# Patient Record
Sex: MALE | Race: BLACK OR AFRICAN AMERICAN | URBAN - METROPOLITAN AREA
[De-identification: names, ages, dates, MRNs, and addresses within clinical notes are randomized per-mention and may not be internally consistent; named-entity substitution may affect disease eponyms.]

---

## 2020-04-29 ENCOUNTER — EMERGENCY (EMERGENCY)
Facility: HOSPITAL | Age: 45
LOS: 1 days | Discharge: HOME | End: 2020-04-29
Admitting: EMERGENCY MEDICINE
Payer: COMMERCIAL

## 2020-04-29 VITALS
TEMPERATURE: 98 F | HEART RATE: 113 BPM | OXYGEN SATURATION: 97 % | DIASTOLIC BLOOD PRESSURE: 88 MMHG | WEIGHT: 179.9 LBS | HEIGHT: 72 IN | RESPIRATION RATE: 18 BRPM | SYSTOLIC BLOOD PRESSURE: 159 MMHG

## 2020-04-29 DIAGNOSIS — N99.528 OTHER COMPLICATION OF INCONTINENT EXTERNAL STOMA OF URINARY TRACT: Chronic | ICD-10-CM

## 2020-04-29 LAB
ALBUMIN SERPL ELPH-MCNC: 4.3 G/DL — SIGNIFICANT CHANGE UP (ref 3.4–5)
ALP SERPL-CCNC: 70 U/L — SIGNIFICANT CHANGE UP (ref 40–120)
ALT FLD-CCNC: 57 U/L — HIGH (ref 12–42)
ANION GAP SERPL CALC-SCNC: 9 MMOL/L — SIGNIFICANT CHANGE UP (ref 9–16)
APTT BLD: 30 SEC — SIGNIFICANT CHANGE UP (ref 27.5–36.3)
AST SERPL-CCNC: 22 U/L — SIGNIFICANT CHANGE UP (ref 15–37)
BASOPHILS # BLD AUTO: 0.02 K/UL — SIGNIFICANT CHANGE UP (ref 0–0.2)
BASOPHILS NFR BLD AUTO: 0.3 % — SIGNIFICANT CHANGE UP (ref 0–2)
BILIRUB SERPL-MCNC: 1.2 MG/DL — SIGNIFICANT CHANGE UP (ref 0.2–1.2)
BUN SERPL-MCNC: 11 MG/DL — SIGNIFICANT CHANGE UP (ref 7–23)
CALCIUM SERPL-MCNC: 9.7 MG/DL — SIGNIFICANT CHANGE UP (ref 8.5–10.5)
CHLORIDE SERPL-SCNC: 100 MMOL/L — SIGNIFICANT CHANGE UP (ref 96–108)
CO2 SERPL-SCNC: 27 MMOL/L — SIGNIFICANT CHANGE UP (ref 22–31)
CREAT SERPL-MCNC: 0.97 MG/DL — SIGNIFICANT CHANGE UP (ref 0.5–1.3)
EOSINOPHIL # BLD AUTO: 0.2 K/UL — SIGNIFICANT CHANGE UP (ref 0–0.5)
EOSINOPHIL NFR BLD AUTO: 2.5 % — SIGNIFICANT CHANGE UP (ref 0–6)
GLUCOSE SERPL-MCNC: 96 MG/DL — SIGNIFICANT CHANGE UP (ref 70–99)
HCT VFR BLD CALC: 42.9 % — SIGNIFICANT CHANGE UP (ref 39–50)
HGB BLD-MCNC: 14.8 G/DL — SIGNIFICANT CHANGE UP (ref 13–17)
IMM GRANULOCYTES NFR BLD AUTO: 0.6 % — SIGNIFICANT CHANGE UP (ref 0–1.5)
INR BLD: 1.18 — HIGH (ref 0.88–1.16)
LYMPHOCYTES # BLD AUTO: 1.82 K/UL — SIGNIFICANT CHANGE UP (ref 1–3.3)
LYMPHOCYTES # BLD AUTO: 23.1 % — SIGNIFICANT CHANGE UP (ref 13–44)
MCHC RBC-ENTMCNC: 29.4 PG — SIGNIFICANT CHANGE UP (ref 27–34)
MCHC RBC-ENTMCNC: 34.5 GM/DL — SIGNIFICANT CHANGE UP (ref 32–36)
MCV RBC AUTO: 85.1 FL — SIGNIFICANT CHANGE UP (ref 80–100)
MONOCYTES # BLD AUTO: 0.8 K/UL — SIGNIFICANT CHANGE UP (ref 0–0.9)
MONOCYTES NFR BLD AUTO: 10.2 % — SIGNIFICANT CHANGE UP (ref 2–14)
NEUTROPHILS # BLD AUTO: 4.99 K/UL — SIGNIFICANT CHANGE UP (ref 1.8–7.4)
NEUTROPHILS NFR BLD AUTO: 63.3 % — SIGNIFICANT CHANGE UP (ref 43–77)
NRBC # BLD: 0 /100 WBCS — SIGNIFICANT CHANGE UP (ref 0–0)
PLATELET # BLD AUTO: 267 K/UL — SIGNIFICANT CHANGE UP (ref 150–400)
POTASSIUM SERPL-MCNC: 4 MMOL/L — SIGNIFICANT CHANGE UP (ref 3.5–5.3)
POTASSIUM SERPL-SCNC: 4 MMOL/L — SIGNIFICANT CHANGE UP (ref 3.5–5.3)
PROT SERPL-MCNC: 8.8 G/DL — HIGH (ref 6.4–8.2)
PROTHROM AB SERPL-ACNC: 13.1 SEC — HIGH (ref 10–12.9)
RBC # BLD: 5.04 M/UL — SIGNIFICANT CHANGE UP (ref 4.2–5.8)
RBC # FLD: 13.1 % — SIGNIFICANT CHANGE UP (ref 10.3–14.5)
SODIUM SERPL-SCNC: 136 MMOL/L — SIGNIFICANT CHANGE UP (ref 132–145)
WBC # BLD: 7.88 K/UL — SIGNIFICANT CHANGE UP (ref 3.8–10.5)
WBC # FLD AUTO: 7.88 K/UL — SIGNIFICANT CHANGE UP (ref 3.8–10.5)

## 2020-04-29 PROCEDURE — 99284 EMERGENCY DEPT VISIT MOD MDM: CPT

## 2020-04-29 NOTE — ED PROVIDER NOTE - OBJECTIVE STATEMENT
43 y/o male s/p right nephrostomy tube placement on 3/19/20 by Dr. 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 He states the tube got caught on something when bending over and opened the skin suture, which pulled the tube approximately 3cm. He states that urine began leaking from the nephrostomy tube and doesn't appear to be draining properly anymore. Patient also endorsed mild localized pain to the site. Denies any other associated injuries or medical complaints at this time. Patient called Dr. Schwarz's office and is awaiting for a call back. He does not know any further details about his "obstruction" or when he is supposed to have the nephrostomy tube removed, stating that everything has been on hold due to the current COVID-19 crisis. 45 y/o male with Hx Right UPJ Obstruction s/p right nephrostomy tube placement on 3/19/20 by Dr. 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 He states the tube got caught on something when bending over and opened the skin suture, which pulled the tube approximately 3cm. He states that urine began leaking from the nephrostomy tube and doesn't appear to be draining properly anymore. Patient also endorsed mild localized pain to the site. Denies any other associated injuries or medical complaints at this time. Patient called Dr. Schwarz's office and is awaiting for a call back. He does not know any further details about his "obstruction" or when he is supposed to have the nephrostomy tube removed, stating that everything has been on hold due to the current COVID-19 crisis.

## 2020-04-29 NOTE — ED PROVIDER NOTE - PROGRESS NOTE DETAILS
Dr. Schwarz's office was contacted and the case was discussed with LORIE Meehan ([phone]) who requests that the patient have pre-op labs drawn here and be discharged to follow up in their office on Friday, May 1st at 8:00am for nephrostomy catheter replacement. Pt is sitting comfortably in NAD and agrees to plan. Strict return precautions reviewed with pt in which pt verbalizes understanding and agrees to.

## 2020-04-29 NOTE — ED PROVIDER NOTE - NSFOLLOWUPINSTRUCTIONS_ED_ALL_ED_FT
Please follow up with your upcoming Surgery appointment on Friday May 1st at 8:00am as scheduled for replacement of your catheter:    [facility] - Dr. Schwarz  [address] [place] [address]  [phone]    YOUR APPOINTMENT IS SCHEDULED FOR FRIDAY, MAY 1ST AT 8:00AM. PLEASE ARRIVE BY 7:45AM AND BE SURE TO BRING A COPY OF YOUR LABS FROM TODAY.    RETURN TO THE ER IMMEDIATELY OR CALL 911 IF YOU DEVELOP A FEVER, SHAKING CHILLS, BACK PAIN, ABDOMINAL PAIN, DIFFICULTY URINATING, REDNESS OR SWELLING TO YOUR CATHETER SITE, OR FOR ANY OTHER NEW OR SUDDEN CONCERNS.

## 2020-04-29 NOTE — ED PROVIDER NOTE - CLINICAL SUMMARY MEDICAL DECISION MAKING FREE TEXT BOX
45 y/o male s/p nephrostomy tube placement of 3/19/20 presenting after nephrostomy tube was accidently pulled out. Will attempt to call Dr. Schwarz to discuss further management.

## 2020-04-29 NOTE — ED PROVIDER NOTE - GENITOURINARY, MLM
Nephrostomy tube on right side with broken skin suture, which appears to be approximately 3cm out from incision site. Slight urinary drainage from stoma. No swelling, tenderness, crepitus, bleeding, or discharge otherwise.

## 2020-04-29 NOTE — ED ADULT NURSE NOTE - OBJECTIVE STATEMENT
45 y/o M c/o nephrostomy tube displacement- reports his R nephrostomy tube was placed 3/19. 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  Reports mild pain to the site and that it is no longer draining.

## 2020-04-29 NOTE — ED PROVIDER NOTE - PATIENT PORTAL LINK FT
You can access the FollowMyHealth Patient Portal offered by [facility] by registering at the following website: [email or web address] By joining [email or web address]’s FollowMyHealth portal, you will also be able to view your health information using other applications (apps) compatible with our system.

## 2020-04-29 NOTE — ED ADULT TRIAGE NOTE - CHIEF COMPLAINT QUOTE
Pt accidentally pulled on Rt nephrostomy tube 1hour PTA. Suture ruptured and tube came out by 3cm. Denies acute pain.
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
## 2020-05-02 DIAGNOSIS — T83.092A OTHER MECHANICAL COMPLICATION OF NEPHROSTOMY CATHETER, INITIAL ENCOUNTER: ICD-10-CM
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
## 2020-05-02 DIAGNOSIS — N13.5 CROSSING VESSEL AND STRICTURE OF URETER WITHOUT HYDRONEPHROSIS: ICD-10-CM
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
